# Patient Record
Sex: FEMALE | Race: WHITE | Employment: FULL TIME
[De-identification: names, ages, dates, MRNs, and addresses within clinical notes are randomized per-mention and may not be internally consistent; named-entity substitution may affect disease eponyms.]

---

## 2017-06-24 ENCOUNTER — HEALTH MAINTENANCE LETTER (OUTPATIENT)
Age: 42
End: 2017-06-24

## 2018-10-19 ENCOUNTER — OFFICE VISIT (OUTPATIENT)
Dept: FAMILY MEDICINE | Facility: CLINIC | Age: 43
End: 2018-10-19

## 2018-10-19 VITALS
TEMPERATURE: 98.7 F | HEART RATE: 75 BPM | RESPIRATION RATE: 16 BRPM | DIASTOLIC BLOOD PRESSURE: 78 MMHG | OXYGEN SATURATION: 98 % | SYSTOLIC BLOOD PRESSURE: 121 MMHG | HEIGHT: 63 IN | BODY MASS INDEX: 20.55 KG/M2 | WEIGHT: 116 LBS

## 2018-10-19 DIAGNOSIS — K04.7 TOOTH ABSCESS: ICD-10-CM

## 2018-10-19 DIAGNOSIS — R22.0 LIP SWELLING: Primary | ICD-10-CM

## 2018-10-19 PROCEDURE — 99214 OFFICE O/P EST MOD 30 MIN: CPT | Performed by: FAMILY MEDICINE

## 2018-10-19 RX ORDER — HYDROCORTISONE 2.5 %
CREAM (GRAM) TOPICAL 2 TIMES DAILY
Qty: 60 G | Refills: 1 | Status: SHIPPED | OUTPATIENT
Start: 2018-10-19

## 2018-10-19 NOTE — LETTER
[facility]  [id number] [place] [address]  Phone: [phone]    October 19, 2018        Noreenlorie CARRILLO Erikchristy  [address] [facility] [address]          To whom it may concern:    RE: Jonathan Palacio    Patient was seen and treated today at our clinic and missed work.  Patient may return to work 10/22/2018 with the following:  No restrictions    Please contact me for questions or concerns.      Sincerely,        Elisabeth Newell MD

## 2018-10-19 NOTE — LETTER
[address] [address]  Phone: [phone]    October 19, 2018        Noreenlorie CARRILLO Erikchristy  [address] [facility] [address]          To whom it may concern:    RE: Jonathan Palacio    Patient was seen and treated today at our clinic and missed work.  Patient may return to work 9/22/2018.    Please contact me for questions or concerns.      Sincerely,        Elisabeth Newell MD

## 2018-10-19 NOTE — PROGRESS NOTES
SUBJECTIVE:   Jonathan Palacio is a 43 year old female who presents to clinic today for the following health issues:      Concern -swollen lips  Onset: 1 week ago.    Description:   Swollen lips, and feels irritation and painful.  Also they feel very dry.    Intensity: severe    Progression of Symptoms:  worsening    Accompanying Signs & Symptoms:  Pain, swelling, burning    Previous history of similar problem:   Yes, about 2 months ago, pt did have swelling in the lips and redness that went away on it's own.  Admits to constant dryness in the lips on both sides.  Has she been having pain and swelling in the back tooth on tohe Rt side too. These symptoms started 2 days ago.        Problem list and histories reviewed & adjusted, as indicated.  Additional history: as documented    Patient Active Problem List   Diagnosis     Depressive disorder, not elsewhere classified     Anxiety state     History reviewed. No pertinent surgical history.    Social History   Substance Use Topics     Smoking status: Current Every Day Smoker     Packs/day: 1.00     Years: 6.00     Smokeless tobacco: Never Used     Alcohol use Yes      Comment: rare     Family History   Problem Relation Age of Onset     Diabetes Mother      Hypertension Mother      Depression Mother      Cerebrovascular Disease Maternal Grandmother       age 62     Family History Negative Father      Depression Sister      Depression Brother      Psychotic Disorder Brother      ADD           Reviewed and updated as needed this visit by clinical staff  Tobacco  Allergies  Meds  Med Hx  Surg Hx  Fam Hx  Soc Hx      Reviewed and updated as needed this visit by Provider         ROS:  CONSTITUTIONAL: NEGATIVE for fever, chills, change in weight  ENT/MOUTH: NEGATIVE for ear, mouth and throat problems, no throat swelling or lip swelling.  RESP: NEGATIVE for significant cough or SOB    OBJECTIVE:     /78 (BP Location: Right arm, Patient Position: Chair,  "Cuff Size: Adult Regular)  Pulse 75  Temp 98.7  F (37.1  C) (Oral)  Resp 16  Ht 5' 2.5\" (1.588 m)  Wt 116 lb (52.6 kg)  SpO2 98%  BMI 20.88 kg/m2  Body mass index is 20.88 kg/(m^2).  GENERAL: healthy, alert and no distress  HENT: ear canals and TM's normal, throat is clear, tongue: no swelling.  HENT: there is swelling on the Rt lower gum, with significant decay in  tooth #20, there is also swelling of both lips, with mild tenderness and very dry skin especially on the edges of the lips.  NECK: no adenopathy, no asymmetry, masses, or scars and thyroid normal to palpation  RESP: lungs clear to auscultation - no rales, rhonchi or wheezes  CV: regular rate and rhythm, normal S1 S2, no S3 or S4, no murmur, click or rub, no peripheral edema and peripheral pulses strong        ASSESSMENT/PLAN:             1. Lip swelling  moslty eczema, with secondary bacterial infection, start on   - amoxicillin-clavulanate (AUGMENTIN) 875-125 MG per tablet; Take 1 tablet by mouth 2 times daily  Dispense: 20 tablet; Refill: 0  Also use  - hydrocortisone 2.5 % cream; Apply topically 2 times daily  Dispense: 60 g; Refill: 1  Use chopstick and avoid licking the lips.    2. Tooth abscess  Start on   - amoxicillin-clavulanate (AUGMENTIN) 875-125 MG per tablet; Take 1 tablet by mouth 2 times daily  Dispense: 20 tablet; Refill: 0  And check with the dentist ASAP    Follow up in 3 days if symptoms persist, sooner if symptoms worsen or new ones develops, pt may contact us over the phone for any questions or concerns.      Elisabeth Newell MD  [facility]"

## 2018-10-19 NOTE — MR AVS SNAPSHOT
"              After Visit Summary   10/19/2018    Jonathan Palacio    MRN: [id number]           Patient Information     Date Of Birth          1975        Visit Information        Provider Department      10/19/2018 11:30 AM Elisabeth Newell MD [facility]        Today's Diagnoses     Lip swelling    -  1    Tooth abscess           Follow-ups after your visit        Follow-up notes from your care team     Return in about 5 days (around 10/24/2018), or if symptoms worsen or fail to improve.      Who to contact     If you have questions or need follow up information about today's clinic visit or your schedule please contact [facility] directly at [phone].  Normal or non-critical lab and imaging results will be communicated to you by MyChart, letter or phone within 4 business days after the clinic has received the results. If you do not hear from us within 7 days, please contact the clinic through MyChart or phone. If you have a critical or abnormal lab result, we will notify you by phone as soon as possible.  Submit refill requests through [employer] or call your pharmacy and they will forward the refill request to us. Please allow 3 business days for your refill to be completed.          Additional Information About Your Visit        MyChart Information     [employer] lets you send messages to your doctor, view your test results, renew your prescriptions, schedule appointments and more. To sign up, go to [email or web address] . Click on \"Log in\" on the left side of the screen, which will take you to the Welcome page. Then click on \"Sign up Now\" on the right side of the page.     You will be asked to enter the access code listed below, as well as some personal information. Please follow the directions to create your username and password.     Your access code is: [email or web address]  Expires: 2019 11:56 AM     Your access code will  in 90 days. If you need help or a new code, " "please call your [facility] clinic or [phone].        Care EveryWhere ID     This is your Care EveryWhere ID. This could be used by other organizations to access your [facility] medical records  [id number]        Your Vitals Were     Pulse Temperature Respirations Height Pulse Oximetry BMI (Body Mass Index)    75 98.7  F (37.1  C) (Oral) 16 5' 2.5\" (1.588 m) 98% 20.88 kg/m2       Blood Pressure from Last 3 Encounters:   10/19/18 121/78   06/16/06 98/60   05/31/05 106/60    Weight from Last 3 Encounters:   10/19/18 116 lb (52.6 kg)   06/16/06 111 lb (50.3 kg)   05/31/05 115 lb (52.2 kg)              Today, you had the following     No orders found for display         Today's Medication Changes          These changes are accurate as of 10/19/18 11:56 AM.  If you have any questions, ask your nurse or doctor.               Start taking these medicines.        Dose/Directions    amoxicillin-clavulanate 875-125 MG per tablet   Commonly known as:  AUGMENTIN   Used for:  Tooth abscess, Lip swelling   Started by:  Elisabeth Newell MD        Dose:  1 tablet   Take 1 tablet by mouth 2 times daily   Quantity:  20 tablet   Refills:  0       hydrocortisone 2.5 % cream   Used for:  Lip swelling   Started by:  Elisabeth Newell MD        Apply topically 2 times daily   Quantity:  60 g   Refills:  1            Where to get your medicines      These medications were sent to [facility] [address]  [address] [facility] [address]     Phone:  [phone]     amoxicillin-clavulanate 875-125 MG per tablet    hydrocortisone 2.5 % cream                Primary Care Provider Office Phone # Fax #    Steven Cote [phone]       [address] [address]        Equal Access to Services     [facility] ELENA : Nelia dwyer Sozahida, waaxda luqadaha, qaybta kaalmeeta meneses. [facility] [phone].    ATENCIÓN: Si " harvinder barboza, tiene a malone disposición servicios gratuitos de asistencia lingüística. Jose steinberg [phone].    We comply with applicable federal civil rights laws and [place] laws. We do not discriminate on the basis of race, color, national origin, age, disability, sex, sexual orientation, or gender identity.            Thank you!     Thank you for choosing [facility]  for your care. Our goal is always to provide you with excellent care. Hearing back from our patients is one way we can continue to improve our services. Please take a few minutes to complete the written survey that you may receive in the mail after your visit with us. Thank you!             Your Updated Medication List - Protect others around you: Learn how to safely use, store and throw away your medicines at www.disposemymeds.org.          This list is accurate as of 10/19/18 11:56 AM.  Always use your most recent med list.                   Brand Name Dispense Instructions for use Diagnosis    amoxicillin-clavulanate 875-125 MG per tablet    AUGMENTIN    20 tablet    Take 1 tablet by mouth 2 times daily    Tooth abscess, Lip swelling       celeXA 40 MG tablet   Generic drug:  citalopram     30    1 TABLET DAILY    Major depressive disorder, single episode, moderate (H), Anxiety state, unspecified       hydrocortisone 2.5 % cream     60 g    Apply topically 2 times daily    Lip swelling